# Patient Record
Sex: MALE | Race: WHITE
[De-identification: names, ages, dates, MRNs, and addresses within clinical notes are randomized per-mention and may not be internally consistent; named-entity substitution may affect disease eponyms.]

---

## 2017-01-19 ENCOUNTER — HOSPITAL ENCOUNTER (OUTPATIENT)
Age: 67
Discharge: HOME | End: 2017-01-19
Payer: MEDICARE

## 2017-01-19 DIAGNOSIS — R05: Primary | ICD-10-CM

## 2017-03-01 ENCOUNTER — HOSPITAL ENCOUNTER (OUTPATIENT)
Age: 67
End: 2017-03-01
Payer: MEDICARE

## 2017-03-01 DIAGNOSIS — Z11.59: ICD-10-CM

## 2017-03-01 DIAGNOSIS — E78.5: Primary | ICD-10-CM

## 2017-03-01 DIAGNOSIS — I10: ICD-10-CM

## 2020-02-12 ENCOUNTER — HOSPITAL ENCOUNTER (OUTPATIENT)
Dept: HOSPITAL 76 - DI | Age: 70
Discharge: HOME | End: 2020-02-12
Attending: FAMILY MEDICINE
Payer: MEDICARE

## 2020-02-12 DIAGNOSIS — M19.011: Primary | ICD-10-CM

## 2020-02-13 NOTE — XRAY REPORT
Reason:  ARTHRITIS RIGHT SHOULDER

Procedure Date:  02/12/2020   

Accession Number:  [id number] / [id number]                    

Procedure:  XR  - Shoulder 3 View RT CPT Code:  

 

***Final Report***

 

 

FULL RESULT:

 

 

EXAM:

RIGHT SHOULDER RADIOGRAPHY

 

EXAM DATE: 2/12/2020 05:52 PM.

 

CLINICAL HISTORY: Right shoulder arthritis.

 

COMPARISON: None.

 

TECHNIQUE: 3 views.

 

FINDINGS:

Bones: Normal. No fracture or bone lesion.

 

Joints: No dislocation evident. Tiny osteophytes developing about the 

inferior glenohumeral joint. The acromioclavicular joint also 

demonstrates mild to moderate osteoarthritis.

 

Soft tissues: The visualized hemithorax is unremarkable. No soft tissue 

swelling.

IMPRESSION:

1. Mild glenohumeral and mild to moderate acromioclavicular joint 

osteoarthritis.

2. No fracture or malalignment.

 

[facility]

## 2020-06-09 ENCOUNTER — HOSPITAL ENCOUNTER (OUTPATIENT)
Dept: HOSPITAL 76 - DI | Age: 70
Discharge: HOME | End: 2020-06-09
Attending: FAMILY MEDICINE
Payer: MEDICARE

## 2020-06-09 DIAGNOSIS — R07.9: Primary | ICD-10-CM

## 2020-06-09 PROCEDURE — 71046 X-RAY EXAM CHEST 2 VIEWS: CPT

## 2020-06-09 NOTE — XRAY REPORT
Reason:  BILATERAL CHEST PAIN

Procedure Date:  06/09/2020   

Accession Number:  [id number] / [id number]                    

Procedure:  XR  - Chest 2 View X-Ray CPT Code:  65006

 

***Final Report***

 

 

FULL RESULT:

 

 

PROCEDURE:  Chest 2 View X-Ray

 

INDICATIONS:  BILATERAL CHEST PAIN

 

TECHNIQUE:  2 view(s) of the chest.

 

COMPARISON:  None.

 

FINDINGS:

 

Surgical changes and devices:  None.

 

Lungs and pleura:  No pleural effusions or pneumothorax.  Lungs are clear.

 

Mediastinum:  Mediastinal contours are normal.  Heart size is normal.

 

Bones and chest wall:  No suspicious bony abnormalities.  Soft tissues 

appear unremarkable.

 

IMPRESSION:  No acute cardiopulmonary pathology.

 

Reviewed by: Marcos Harman MD on 6/9/2020 3:15 PM PDT

Approved by: Macros Harman MD on 6/9/2020 3:15 PM PDT

 

 

Station ID:  [id number]

## 2021-03-01 ENCOUNTER — HOSPITAL ENCOUNTER (EMERGENCY)
Dept: HOSPITAL 76 - ED | Age: 71
Discharge: HOME | End: 2021-03-01
Payer: MEDICARE

## 2021-03-01 VITALS — DIASTOLIC BLOOD PRESSURE: 90 MMHG | SYSTOLIC BLOOD PRESSURE: 158 MMHG

## 2021-03-01 DIAGNOSIS — R06.02: ICD-10-CM

## 2021-03-01 DIAGNOSIS — F41.9: Primary | ICD-10-CM

## 2021-03-01 DIAGNOSIS — F43.9: ICD-10-CM

## 2021-03-01 LAB
ALBUMIN DIAFP-MCNC: 4.7 G/DL (ref 3.2–5.5)
ALBUMIN/GLOB SERPL: 1.6 {RATIO} (ref 1–2.2)
ALP SERPL-CCNC: 62 IU/L (ref 42–121)
ALT SERPL W P-5'-P-CCNC: 33 IU/L (ref 10–60)
ANION GAP SERPL CALCULATED.4IONS-SCNC: 12 MMOL/L (ref 6–13)
AST SERPL W P-5'-P-CCNC: 28 IU/L (ref 10–42)
BASOPHILS NFR BLD AUTO: 0 10^3/UL (ref 0–0.1)
BASOPHILS NFR BLD AUTO: 0.4 %
BILIRUB BLD-MCNC: 0.6 MG/DL (ref 0.2–1)
BUN SERPL-MCNC: 18 MG/DL (ref 6–20)
CALCIUM UR-MCNC: 9.9 MG/DL (ref 8.5–10.3)
CHLORIDE SERPL-SCNC: 99 MMOL/L (ref 101–111)
CO2 SERPL-SCNC: 24 MMOL/L (ref 21–32)
CREAT SERPLBLD-SCNC: 1 MG/DL (ref 0.6–1.2)
EOSINOPHIL # BLD AUTO: 0.1 10^3/UL (ref 0–0.7)
EOSINOPHIL NFR BLD AUTO: 0.7 %
ERYTHROCYTE [DISTWIDTH] IN BLOOD BY AUTOMATED COUNT: 12.3 % (ref 12–15)
GFRSERPLBLD MDRD-ARVRAT: 74 ML/MIN/{1.73_M2} (ref 89–?)
GLOBULIN SER-MCNC: 2.9 G/DL (ref 2.1–4.2)
GLUCOSE SERPL-MCNC: 103 MG/DL (ref 70–100)
HCT VFR BLD AUTO: 46.1 % (ref 42–52)
HGB UR QL STRIP: 15.6 G/DL (ref 14–18)
LYMPHOCYTES # SPEC AUTO: 2 10^3/UL (ref 1.5–3.5)
LYMPHOCYTES NFR BLD AUTO: 19.7 %
MCH RBC QN AUTO: 30.8 PG (ref 27–31)
MCHC RBC AUTO-ENTMCNC: 33.8 G/DL (ref 32–36)
MCV RBC AUTO: 90.9 FL (ref 80–94)
MONOCYTES # BLD AUTO: 0.6 10^3/UL (ref 0–1)
MONOCYTES NFR BLD AUTO: 6.2 %
NEUTROPHILS # BLD AUTO: 7.4 10^3/UL (ref 1.5–6.6)
NEUTROPHILS # SNV AUTO: 10.1 X10^3/UL (ref 4.8–10.8)
NEUTROPHILS NFR BLD AUTO: 72.7 %
NRBC # BLD AUTO: 0 /100WBC
NRBC # BLD AUTO: 0 X10^3/UL
PDW BLD AUTO: 12 FL (ref 7.4–11.4)
PLATELET # BLD: 219 10^3/UL (ref 130–450)
POTASSIUM SERPL-SCNC: 4 MMOL/L (ref 3.5–5)
PROT SPEC-MCNC: 7.6 G/DL (ref 6.7–8.2)
RBC MAR: 5.07 10^6/UL (ref 4.7–6.1)
SODIUM SERPLBLD-SCNC: 135 MMOL/L (ref 135–145)

## 2021-03-01 PROCEDURE — 71046 X-RAY EXAM CHEST 2 VIEWS: CPT

## 2021-03-01 PROCEDURE — 83880 ASSAY OF NATRIURETIC PEPTIDE: CPT

## 2021-03-01 PROCEDURE — 80053 COMPREHEN METABOLIC PANEL: CPT

## 2021-03-01 PROCEDURE — 99283 EMERGENCY DEPT VISIT LOW MDM: CPT

## 2021-03-01 PROCEDURE — 84484 ASSAY OF TROPONIN QUANT: CPT

## 2021-03-01 PROCEDURE — 93005 ELECTROCARDIOGRAM TRACING: CPT

## 2021-03-01 PROCEDURE — 36415 COLL VENOUS BLD VENIPUNCTURE: CPT

## 2021-03-01 PROCEDURE — 85025 COMPLETE CBC W/AUTO DIFF WBC: CPT

## 2021-03-01 NOTE — ED PHYSICIAN DOCUMENTATION
History of Present Illness





- Stated complaint


Stated Complaint: soa





- Chief complaint


Chief Complaint: General





- History obtained from


History obtained from: Patient





- Additonal information


Additional information: 





70-year-old gentleman presents with about 3 days worth of shortness of breath 

with mild cough.  He denies fevers or chest pain.  No pedal edema.  When talking

about her shortness of breath he deviates to how much stress he is under, starts

talking about his getting kicked out of his house 10 years ago when he was being

 from his wife.





Review of Systems


Ten Systems: 10 systems reviewed and negative


Constitutional: reports: Reviewed and negative


Eyes: reports: Reviewed and negative


Nose: denies: Rhinorrhea / runny nose, Congestion


Throat: denies: Oral lesions / sores


Cardiac: denies: Chest pain / pressure


Respiratory: reports: Dyspnea, Cough





PD PAST MEDICAL HISTORY





- Present Medications


Home Medications: 


                                Ambulatory Orders











 Medication  Instructions  Recorded  Confirmed


 


LORazepam [Ativan] 1 mg PO TID PRN #12 tab 21 


 


lisinopriL [Lisinopril] 1 tab PO DAILY 21














- Allergies


Allergies/Adverse Reactions: 


                                    Allergies











Allergy/AdvReac Type Severity Reaction Status Date / Time


 


Penicillins Allergy  Anaphylaxis Verified 21 18:46














PD ED PE NORMAL





- Vitals


Vital signs reviewed: Yes





- General


General: Alert and oriented X 3 (He is at times tearful with poor eye contact)





- HEENT


HEENT: PERRL, EOMI





- Neck


Neck: Supple, no meningeal sign, No bony TTP, No JVD





- Cardiac


Cardiac: RRR, No murmur





- Respiratory


Respiratory: No respiratory distress, Clear bilaterally





- Abdomen


Abdomen: Non tender





- Back


Back: No CVA TTP, No spinal TTP





- Derm


Derm: Normal color, Warm and dry





- Extremities


Extremities: No edema, No calf tenderness / cord





- Neuro


Neuro: Alert and oriented X 3, Normal speech





Results





- Vitals


Vitals: 


                               Vital Signs - 24 hr











  21





  18:43


 


Temperature 36.3 C L


 


Heart Rate 90


 


Respiratory 20





Rate 


 


Blood Pressure 176/91 H


 


O2 Saturation 100








                                     Oxygen











O2 Source                      Room air

















- EKG (time done)


  ** 1900


Rate: Rate (enter#) (81)


Rhythm: NSR


Axis: Normal


Intervals: Normal NM


QRS: Normal


Ischemia: Q waves (anteroseptal).  No: ST elevation c/w ischemia, ST depression


Compare to prior EKG: Old EKG unavailable


Computer interpretation: Agree with computer





- Labs


Labs: 


                                Laboratory Tests











  21





  20:03 20:03 20:03


 


WBC  10.1  


 


RBC  5.07  


 


Hgb  15.6  


 


Hct  46.1  


 


MCV  90.9  


 


MCH  30.8  


 


MCHC  33.8  


 


RDW  12.3  


 


Plt Count  219  


 


MPV  12.0 H  


 


Neut # (Auto)  7.4 H  


 


Lymph # (Auto)  2.0  


 


Mono # (Auto)  0.6  


 


Eos # (Auto)  0.1  


 


Baso # (Auto)  0.0  


 


Absolute Nucleated RBC  0.00  


 


Nucleated RBC %  0.0  


 


Sodium   135 


 


Potassium   4.0 


 


Chloride   99 L 


 


Carbon Dioxide   24 


 


Anion Gap   12.0 


 


BUN   18 


 


Creatinine   1.0 


 


Estimated GFR (MDRD)   74 L 


 


Glucose   103 H 


 


Calcium   9.9 


 


Total Bilirubin   0.6 


 


AST   28 


 


ALT   33 


 


Alkaline Phosphatase   62 


 


Troponin I High Sens    3.9


 


B-Natriuretic Peptide   


 


Total Protein   7.6 


 


Albumin   4.7 


 


Globulin   2.9 


 


Albumin/Globulin Ratio   1.6 














  21





  20:03


 


WBC 


 


RBC 


 


Hgb 


 


Hct 


 


MCV 


 


MCH 


 


MCHC 


 


RDW 


 


Plt Count 


 


MPV 


 


Neut # (Auto) 


 


Lymph # (Auto) 


 


Mono # (Auto) 


 


Eos # (Auto) 


 


Baso # (Auto) 


 


Absolute Nucleated RBC 


 


Nucleated RBC % 


 


Sodium 


 


Potassium 


 


Chloride 


 


Carbon Dioxide 


 


Anion Gap 


 


BUN 


 


Creatinine 


 


Estimated GFR (MDRD) 


 


Glucose 


 


Calcium 


 


Total Bilirubin 


 


AST 


 


ALT 


 


Alkaline Phosphatase 


 


Troponin I High Sens 


 


B-Natriuretic Peptide  16


 


Total Protein 


 


Albumin 


 


Globulin 


 


Albumin/Globulin Ratio 














PD MEDICAL DECISION MAKING





- ED course


ED course: 





Tunnel symptoms shortness of breath.  This is most likely to be psychiatric and 

related to anxiety based on his presentation.  That said the differential 

diagnosis includes other and more serious etiologies in this 70-year-old.  

Recommend initially blood work, EKG, chest x-ray.  He at least initially 

adamantly refused blood work saying that he has an undue fear of Blood draws.  I

 offered a sedative and he is considering.





Subsequently he admitted that about a week ago he smoked some black tar heroin. 

 He thinks it was a bad batch.  He did this because his mom  from Covid and 

he wanted to "escape for a bit.".  He denies SI or HI.  After consideration he 

is submitting to blood work as long as I give him a shot of lidocaine first





As such I personally óscar his blood from the right AC after some local 

anesthetic with 1% lidocaine with epinephrine.





Departure





- Departure


Disposition: 01 Home, Self Care


Clinical Impression: 


 Anxiety





Dyspnea


Qualifiers:


 Dyspnea type: dyspnea on exertion Qualified Code(s): R06.00 - Dyspnea, 

unspecified





Condition: Good


Record reviewed to determine appropriate education?: Yes


Instructions:  ED Dyspnea Shortness of Breath


Prescriptions: 


LORazepam [Ativan] 1 mg PO TID PRN #12 tab


 PRN Reason: Anxiety


Comments: 


It seems likely today that your shortness of breath is due to anxiety and your 

recent loss.  Avoid illegal drug use, follow-up with your primary care 

physician, also consider finding a counselor to talk with.  Return for new or 

worsening symptoms.

## 2021-03-01 NOTE — XRAY REPORT
PROCEDURE:  Chest 2 View X-Ray

 

INDICATIONS:  dyspnea

 

TECHNIQUE:  2 view(s) of the chest.  

 

COMPARISON:  None.

 

FINDINGS:  

 

Surgical changes and devices:  None.  

 

Lungs and pleura:  No pleural effusions or pneumothorax.  Lungs are clear.  

 

Mediastinum:  Mediastinal contours are normal.  Heart size is normal.  

 

Bones and chest wall:  No suspicious bony abnormalities.  Soft tissues appear unremarkable.  

 

IMPRESSION:  No evidence acute pulmonary process.

 

Reviewed by: Yaya Rodrigez MD on 3/1/2021 7:33 PM PST

Approved by: Yaya Rodrigez MD on 3/1/2021 7:33 PM [facility]

 

 

Station ID:  [id number]

## 2021-05-15 ENCOUNTER — HOSPITAL ENCOUNTER (EMERGENCY)
Dept: HOSPITAL 76 - ED | Age: 71
Discharge: HOME | End: 2021-05-15
Payer: MEDICARE

## 2021-05-15 VITALS — SYSTOLIC BLOOD PRESSURE: 153 MMHG | DIASTOLIC BLOOD PRESSURE: 75 MMHG

## 2021-05-15 DIAGNOSIS — F11.10: Primary | ICD-10-CM

## 2021-05-15 DIAGNOSIS — I10: ICD-10-CM

## 2021-05-15 PROCEDURE — 96372 THER/PROPH/DIAG INJ SC/IM: CPT

## 2021-05-15 PROCEDURE — 99283 EMERGENCY DEPT VISIT LOW MDM: CPT

## 2021-05-15 NOTE — ED PHYSICIAN DOCUMENTATION
History of Present Illness





- Stated complaint


Stated Complaint: RX REFILL





- Chief complaint


Chief Complaint: General





- History obtained from


History obtained from: Patient





- History of Present Illness


Timing: Today


Pain level max: 0


Pain level now: 0





- Additonal information


Additional information: 





Patient is a 70-year-old male who has been using heroin daily for the last 8 

days.  He states that he is concerned he is going to go through withdrawal and 

is requesting "a pill".  He has Xanax at home already.  He states that 30 years 

ago before he got on a flight, his friend gave him a pill that helped prevent 

withdrawal.  He does not know what this was.  He is currently not experiencing 

any symptoms.  No nausea, vomiting, diarrhea.  No abdominal pain or cramping.  

The patient states last time he went through withdrawal for 8 days, but was 

constipated and did not have any vomiting.  He states he just had generalized 

body pain.  He denies any other drug use.





Review of Systems


Ten Systems: 10 systems reviewed and negative


Constitutional: denies: Fever, Chills


Nose: denies: Rhinorrhea / runny nose, Congestion


Cardiac: denies: Chest pain / pressure


Respiratory: denies: Cough


GI: denies: Abdominal Pain, Nausea, Vomiting, Constipation, Diarrhea


Skin: denies: Rash


Musculoskeletal: denies: Neck pain, Back pain


Neurologic: denies: Headache





PD PAST MEDICAL HISTORY





- Past Medical History


Past Medical History: Yes


Cardiovascular: Hypertension





- Past Surgical History


Past Surgical History: No


General: Hiatal hernia repair





- Present Medications


Home Medications: 


                                Ambulatory Orders











 Medication  Instructions  Recorded  Confirmed


 


ALPRAZolam [Alprazolam] 0.25 mg PO TID PRN 05/15/21 05/15/21


 


Buspirone HCl 10 mg PO DAILY 05/15/21 05/15/21


 


Cyclobenzaprine [Flexeril] 10 mg PO BID 05/15/21 05/15/21


 


Famotidine [Acid-Pep] 20 mg PO DAILY 05/15/21 05/15/21


 


Finasteride [Proscar] 5 mg PO DAILY 05/15/21 05/15/21


 


Lisinopril [Zestril] 40 mg PO DAILY 05/15/21 05/15/21


 


Naproxen [EC-Naproxen] 500 mg PO BID PRN 05/15/21 05/15/21














- Allergies


Allergies/Adverse Reactions: 


                                    Allergies











Allergy/AdvReac Type Severity Reaction Status Date / Time


 


Penicillins Allergy  Anaphylaxis Verified 05/15/21 14:46














- Social History


Does the pt smoke?: No


Smoking Status: Never smoker


Does the pt drink ETOH?: No


Does the pt have substance abuse?: Yes


Substance Use and Type: Heroin





PD ED PE NORMAL





- Vitals


Vital signs reviewed: Yes





- General


General: Alert and oriented X 3, No acute distress, Well developed/nourished





- HEENT


HEENT: PERRL, Moist mucous membranes





- Neck


Neck: Supple, no meningeal sign





- Cardiac


Cardiac: RRR, Strong equal pulses





- Respiratory


Respiratory: No respiratory distress, Clear bilaterally





- Abdomen


Abdomen: Soft, Non tender, Non distended





- Derm


Derm: Warm and dry





- Extremities


Extremities: No edema





- Neuro


Neuro: Alert and oriented X 3





- Psych


Psych: Normal mood, Normal affect





Results





- Vitals


Vitals: 


                               Vital Signs - 24 hr











  05/15/21





  14:37


 


Temperature 36.2 C L


 


Heart Rate 85


 


Respiratory 18





Rate 


 


Blood Pressure 142/78 H


 


O2 Saturation 99








                                     Oxygen











O2 Source                      Room air

















PD MEDICAL DECISION MAKING





- ED course


Complexity details: considered differential, d/w patient


ED course: 





70-year-old male concerned about potential narcotic withdrawal.  Was given a 

dose of buprenorphine here.  We will have him follow-up with a Suboxone clinic t

o discuss treatment options.  Patient refuses rehab, social work consult or 

detox.  Patient already has a bottle of Xanax at home.  Patient counseled 

regarding signs and symptoms for which I believe and urgent re-evaluation would 

be necessary. Patient with good understanding of and agreement to plan and is 

comfortable going home at this time





This document was made in part using voice recognition software. While efforts 

are made to proofread this document, sound alike and grammatical errors may 

occur.





Departure





- Departure


Disposition: 01 Home, Self Care


Clinical Impression: 


 Heroin use





Condition: Good


Instructions:  ED Narcotic Abuse


Follow-Up: 


Neto Quezada DO [Primary Care Provider] - Within 1 week


Comments: 


You can contact [facility] on Monday for a same day appointment, they can 

prescribe suboxone if needed for withdrawal. 








[email or web address]





[phone]


[id number] [place] [address]

## 2021-05-19 ENCOUNTER — HOSPITAL ENCOUNTER (EMERGENCY)
Dept: HOSPITAL 76 - ED | Age: 71
Discharge: HOME | End: 2021-05-19
Payer: MEDICARE

## 2021-05-19 VITALS — SYSTOLIC BLOOD PRESSURE: 160 MMHG | DIASTOLIC BLOOD PRESSURE: 80 MMHG

## 2021-05-19 DIAGNOSIS — I10: ICD-10-CM

## 2021-05-19 DIAGNOSIS — F11.10: Primary | ICD-10-CM

## 2021-05-19 PROCEDURE — 96372 THER/PROPH/DIAG INJ SC/IM: CPT

## 2021-05-19 PROCEDURE — 99284 EMERGENCY DEPT VISIT MOD MDM: CPT

## 2021-05-19 PROCEDURE — 99283 EMERGENCY DEPT VISIT LOW MDM: CPT

## 2021-05-19 NOTE — ED PHYSICIAN DOCUMENTATION
History of Present Illness





- Stated complaint


Stated Complaint: MED REFILL





- Additonal information


Additional information: 


70-year-old male presents the emergency department hoping for a Vaponefrin 

injection as well as a prescription for Suboxone.  He was seen in this emergency

department on the  after using heroin daily for about 8 days.  At that time 

he was concerned that he was going into withdrawal. He was seen by my colleague 

and did receive epinephrine injection.  He was advised to follow-up with [facility].  This gentleman reports that he called [place] [facility] but did not 

understand how to get to the actual clinic.  He also reports that due to night 

sweats some shakes and tremors he did use heroin yesterday.





This gentleman reports that he does care for his wife was at home on oxygen.  He

has very limited resources here [facility] And states that he does not 

have access to a computer.  He is also hesitant to ask his ex-wife for help as 

he does not want her to know that he uses heroin.














Review of Systems


Constitutional: reports: Fever, Myalgias


Eyes: reports: Reviewed and negative


Ears: reports: Reviewed and negative


Nose: reports: Reviewed and negative


Throat: reports: Reviewed and negative


Cardiac: reports: Reviewed and negative


Respiratory: reports: Reviewed and negative


GI: reports: Reviewed and negative


: reports: Dysuria


Skin: reports: Reviewed and negative





PD PAST MEDICAL HISTORY





- Past Medical History


Cardiovascular: Hypertension





- Past Surgical History


Past Surgical History: No


General: Hiatal hernia repair





- Present Medications


Home Medications: 


                                Ambulatory Orders











 Medication  Instructions  Recorded  Confirmed


 


ALPRAZolam [Alprazolam] 0.25 mg PO TID PRN 05/15/21 05/15/21


 


Buspirone HCl 10 mg PO DAILY 05/15/21 05/15/21


 


Cyclobenzaprine [Flexeril] 10 mg PO BID 05/15/21 05/15/21


 


Famotidine [Acid-Pep] 20 mg PO DAILY 05/15/21 05/15/21


 


Finasteride [Proscar] 5 mg PO DAILY 05/15/21 05/15/21


 


Lisinopril [Zestril] 40 mg PO DAILY 05/15/21 05/15/21


 


Naproxen [EC-Naproxen] 500 mg PO BID PRN 05/15/21 05/15/21














- Allergies


Allergies/Adverse Reactions: 


                                    Allergies











Allergy/AdvReac Type Severity Reaction Status Date / Time


 


Penicillins Allergy  Anaphylaxis Verified 21 19:14














- Social History


Does the pt smoke?: No


Smoking Status: Never smoker


Does the pt drink ETOH?: No


Does the pt have substance abuse?: Yes





PD ED PE EXPANDED





- General


General: Alert, No acute distress, Well developed/nourished





- Cardiac


Cardiac: Regular Rate, Radial strong equal, Pedal strong equal, Cap refill < 2 

sec





- Respiratory


Respiratory: Clear to ausultation alejandra.  No: Distress, Labored





- Abdomen


Abdomen: Normal Bowel sounds.  No: Tender to palpation





- Extremities


Extremities: Normal.  No: Deformity, Tenderness





- Neuro


Neuro: Alert and Oriented X 3, CNII-XII intact





- GCS


Eye Opening: Spontaneous


Motor: Obeys Commands


Verbal: Oriented


Total: 15





Results





- Vitals


Vitals: 


                               Vital Signs - 24 hr











  21





  19:14


 


Temperature 36.6 C


 


Heart Rate 88


 


Respiratory 16





Rate 


 


Blood Pressure 180/78 H


 


O2 Saturation 99








                                     Oxygen











O2 Source                      Room air

















PD MEDICAL DECISION MAKING





- ED course


Complexity details: reviewed results, re-evaluated patient, d/w patient


ED course: 





70-year-old male presents the emergency department requesting a readministration

of the epinephrine. He has had difficulty following up with [facility] after 

a recent binge use of heroin after his mother . Here in the emergency 

department he does not have any tachycardia sweats tremors. He does endorse some

mild nausea as well as night sweats last night. He last used heroin on the .

I have given him a one-time injection of epinephrine here in the emergency 

department. Because he is finding difficulty following up with [facility] or 

similar providers as an outpatient I encouraged him to return to the emergency 

department tomorrow to speak with our . He has also secured a ride 

home to prevent himself from driving tonight but he should likely be able to 

drive tomorrow.





Departure





- Departure


Disposition: 01 Home, Self Care


Clinical Impression: 


 Narcotic abuse, episodic





Condition: Stable


Record reviewed to determine appropriate education?: Yes


Comments: 


Franki I would like you to return to the emergency department tomorrow so that 

you can speak with our  who may be able to help you with resources 

to get you to a Suboxone clinic for your long-term narcotic use.





It is important that you do not drive for the next 24 hours after receiving the 

Suboxone. Please do not use heroin tonight.





I wish you luck in your recovery I will see you tomorrow.

## 2022-03-08 ENCOUNTER — HOSPITAL ENCOUNTER (OUTPATIENT)
Dept: HOSPITAL 76 - EMS | Age: 72
End: 2022-03-08
Payer: MEDICARE